# Patient Record
Sex: MALE | Race: BLACK OR AFRICAN AMERICAN | NOT HISPANIC OR LATINO | Employment: UNEMPLOYED | URBAN - METROPOLITAN AREA
[De-identification: names, ages, dates, MRNs, and addresses within clinical notes are randomized per-mention and may not be internally consistent; named-entity substitution may affect disease eponyms.]

---

## 2024-01-12 ENCOUNTER — NURSE TRIAGE (OUTPATIENT)
Dept: NURSING | Facility: CLINIC | Age: 39
End: 2024-01-12

## 2024-01-12 ENCOUNTER — OFFICE VISIT (OUTPATIENT)
Dept: FAMILY MEDICINE | Facility: CLINIC | Age: 39
End: 2024-01-12
Payer: COMMERCIAL

## 2024-01-12 ENCOUNTER — HOSPITAL ENCOUNTER (OUTPATIENT)
Dept: ULTRASOUND IMAGING | Facility: HOSPITAL | Age: 39
Discharge: HOME | End: 2024-01-12
Attending: FAMILY MEDICINE | Admitting: FAMILY MEDICINE
Payer: COMMERCIAL

## 2024-01-12 ENCOUNTER — TELEPHONE (OUTPATIENT)
Dept: FAMILY MEDICINE | Facility: CLINIC | Age: 39
End: 2024-01-12

## 2024-01-12 VITALS
WEIGHT: 164.1 LBS | OXYGEN SATURATION: 95 % | DIASTOLIC BLOOD PRESSURE: 70 MMHG | HEART RATE: 88 BPM | BODY MASS INDEX: 24.3 KG/M2 | SYSTOLIC BLOOD PRESSURE: 104 MMHG | HEIGHT: 69 IN | TEMPERATURE: 97.8 F | RESPIRATION RATE: 18 BRPM

## 2024-01-12 DIAGNOSIS — G89.29 CHRONIC NECK PAIN: ICD-10-CM

## 2024-01-12 DIAGNOSIS — G89.29 CHRONIC NECK PAIN: Primary | ICD-10-CM

## 2024-01-12 DIAGNOSIS — M54.2 CHRONIC NECK PAIN: ICD-10-CM

## 2024-01-12 DIAGNOSIS — M54.2 CHRONIC NECK PAIN: Primary | ICD-10-CM

## 2024-01-12 PROCEDURE — 93971 EXTREMITY STUDY: CPT | Mod: RT

## 2024-01-12 PROCEDURE — 99204 OFFICE O/P NEW MOD 45 MIN: CPT | Performed by: FAMILY MEDICINE

## 2024-01-12 RX ORDER — PREGABALIN 75 MG/1
75 CAPSULE ORAL 2 TIMES DAILY
Qty: 60 CAPSULE | Refills: 0 | Status: SHIPPED | OUTPATIENT
Start: 2024-01-12

## 2024-01-12 NOTE — PROGRESS NOTES
Assessment & Plan     Chronic neck pain  Chronic, unclear etiology as EMG, MRI normal. Could be nerve damage that is not significant enough to be permanent. Does appear to be consistent with radiculopathy. Recommend neuropathic pain relief given it is chronic, will try to treat with NSAIDs. Seeing as how there is no urgent nerve damage, can meet with pain management for symptomatic care.   - pregabalin (LYRICA) 75 MG capsule  Dispense: 60 capsule; Refill: 0  - US Upper Extremity Venous Duplex Right  - diclofenac (VOLTAREN) 50 MG EC tablet  Dispense: 30 tablet; Refill: 1  - Pain Management  Referral  - PRIMARY CARE FOLLOW-UP SCHEDULING      Review of external notes as documented elsewhere in note  Ordering of each unique test  Prescription drug management  I spent a total of 22 minutes on the day of the visit.   Time spent by me doing chart review, history and exam, documentation and further activities per the note       See Patient Instructions    DO EVE ROTH [facility]   Latrice is a 38 year old, presenting for the following health issues:  Neck Pain        1/12/2024     6:55 AM   Additional Questions   Roomed by Mayelin CRANE       History of Present Illness       Reason for visit:  Neck pain leading to numb finger new eye infection    He eats 0-1 servings of fruits and vegetables daily.He consumes 2 sweetened beverage(s) daily.He exercises with enough effort to increase his heart rate 9 or less minutes per day.  He exercises with enough effort to increase his heart rate 3 or less days per week.      STYE   -Developed yesterday        Pain History:  When did you first notice your pain? A Year ago   Have you seen this provider for your pain in the past? Yes   Where in your body do you have pain? Neck and travel downs to the arm and make the fingers goes numb. Right side bending and forward flexion causing neck pain, radiates to the first three fingers. He  "has had nerve testing, MRI. It is worsening. He also reports new itching, starting several months ago. No rash. Do difficulty breathing.   Are you seeing anyone else for your pain? Yes - [facility]    Chronic Pain Follow Up:    Location of pain: Neck  Analgesia/pain control:    - Recent changes:  Worse    - Overall control: Not tolerable    - Current treatments: No   Adherence:     - Do you ever take more pain medicine than prescribed? No    - When did you take your last dose of pain medicine?  N/a   Adverse effects: No   PDMP Review       None          Last CSA Agreement:   CSA -- Patient Level:    CSA: None found at the patient level.       Last UDS:   Patient declined to complete Opioid Risk Tool today    Review of Systems   Constitutional, HEENT, cardiovascular, pulmonary, gi and gu systems are negative, except as otherwise noted.      Objective    /70   Pulse 88   Temp 97.8  F (36.6  C) (Oral)   Resp 18   Ht 1.753 m (5' 9\")   Wt 74.4 kg (164 lb 1.6 oz)   SpO2 95%   BMI 24.23 kg/m    Body mass index is 24.23 kg/m .  Physical Exam   GENERAL: healthy, alert and no distress  EYES: Eyes grossly normal to inspection and swollen lower lid  MS: no gross musculoskeletal defects noted, no edema                  "

## 2024-01-12 NOTE — TELEPHONE ENCOUNTER
----- Message from Brinda Zapata DO sent at 1/12/2024  8:49 AM CST -----  Please let Trivale know that his ultrasound DOES NOT show a blood clot. Please have him proceed with out plan.

## 2024-01-12 NOTE — PATIENT INSTRUCTIONS
I ordered the ultrasound, you should be able to go to the imaging desk and get it completed today.   For the pain, I am going to start TWO medications. One is short term, DICLOFENAC. It is the same medication as IBUPROFEN or ALEVE, so please do not take those while taking this.  The third medication is called PREGABALIN. It is a nerve medication.   Next week, you should get a phone call from our pain center, please  the phone to schedule your INTERVENTION consult. This doctor specializes in injections and other procedures to treat pain.

## 2024-01-12 NOTE — RESULT ENCOUNTER NOTE
Please let Latrice know that his ultrasound DOES NOT show a blood clot. Please have him proceed with out plan.

## 2024-01-12 NOTE — LETTER
January 12, 2024      [facility]  [address]  [place] [address]        Dear ,    We are writing to inform you of your test results.    ultrasound DOES NOT show a blood clot. Please have him proceed with out plan.     Resulted Orders   US Upper Extremity Venous Duplex Right    Narrative    EXAM: US UPPER EXTREMITY VENOUS DUPLEX RIGHT  LOCATION: [facility]  DATE: 1/12/2024    INDICATION: family history of DVT, radicular symptoms  COMPARISON: None.  TECHNIQUE: Venous Duplex ultrasound of the right upper extremity with (when possible) and without compression, augmentation, and duplex. Color flow and spectral Doppler with waveform analysis performed.    FINDINGS: Ultrasound includes evaluation of the internal jugular vein, innominate vein, subclavian vein, axillary vein, and brachial vein. The superficial cephalic and basilic veins were also evaluated where seen.     RIGHT: No deep venous thrombosis. No superficial thrombophlebitis.      Impression    IMPRESSION:  1.  No deep venous thrombosis in the right upper extremity.       If you have any questions or concerns, please call the clinic at the number listed above.       Sincerely,      Brinda Zapata, DO

## 2024-01-12 NOTE — TELEPHONE ENCOUNTER
Triager relayed telephone message, left for pt. At 1/12/2024 @1203 relayed to pt.                   Reason for Disposition    Follow-up information-only call to recent contact, no triage required    Protocols used: Information Only Call - No Triage-A-[place]